# Patient Record
Sex: MALE | Race: AMERICAN INDIAN OR ALASKA NATIVE
[De-identification: names, ages, dates, MRNs, and addresses within clinical notes are randomized per-mention and may not be internally consistent; named-entity substitution may affect disease eponyms.]

---

## 2017-02-12 ENCOUNTER — HOSPITAL ENCOUNTER (EMERGENCY)
Dept: HOSPITAL 5 - ED | Age: 32
Discharge: HOME | End: 2017-02-12
Payer: SELF-PAY

## 2017-02-12 VITALS — SYSTOLIC BLOOD PRESSURE: 110 MMHG | DIASTOLIC BLOOD PRESSURE: 69 MMHG

## 2017-02-12 DIAGNOSIS — R19.7: Primary | ICD-10-CM

## 2017-02-12 LAB
ALBUMIN SERPL-MCNC: 3.9 G/DL (ref 3.9–5)
ALBUMIN/GLOB SERPL: 1.3 %
ALP SERPL-CCNC: 57 UNITS/L (ref 35–129)
ALT SERPL-CCNC: 18 UNITS/L (ref 7–56)
ANION GAP SERPL CALC-SCNC: 19 MMOL/L
BASOPHILS NFR BLD AUTO: 0.2 % (ref 0–1.8)
BILIRUB DIRECT SERPL-MCNC: < 0.2 MG/DL (ref 0–0.2)
BILIRUB SERPL-MCNC: 0.3 MG/DL (ref 0.1–1.2)
BUN SERPL-MCNC: 11 MG/DL (ref 9–20)
BUN/CREAT SERPL: 10 %
CALCIUM SERPL-MCNC: 8.1 MG/DL (ref 8.4–10.2)
CHLORIDE SERPL-SCNC: 102.7 MMOL/L (ref 98–107)
CO2 SERPL-SCNC: 22 MMOL/L (ref 22–30)
EOSINOPHIL NFR BLD AUTO: 0.3 % (ref 0–4.3)
GLUCOSE SERPL-MCNC: 106 MG/DL (ref 75–100)
HCT VFR BLD CALC: 44.4 % (ref 35.5–45.6)
HGB BLD-MCNC: 14.4 GM/DL (ref 11.8–15.2)
LIPASE SERPL-CCNC: 38 UNITS/L (ref 13–60)
MCH RBC QN AUTO: 30 PG (ref 28–32)
MCHC RBC AUTO-ENTMCNC: 33 % (ref 32–34)
MCV RBC AUTO: 93 FL (ref 84–94)
PLATELET # BLD: 215 K/MM3 (ref 140–440)
POTASSIUM SERPL-SCNC: 4.8 MMOL/L (ref 3.6–5)
PROT SERPL-MCNC: 6.9 G/DL (ref 6.3–8.2)
RBC # BLD AUTO: 4.78 M/MM3 (ref 3.65–5.03)
SODIUM SERPL-SCNC: 139 MMOL/L (ref 137–145)
WBC # BLD AUTO: 8.2 K/MM3 (ref 4.5–11)

## 2017-02-12 PROCEDURE — 36415 COLL VENOUS BLD VENIPUNCTURE: CPT

## 2017-02-12 PROCEDURE — 96360 HYDRATION IV INFUSION INIT: CPT

## 2017-02-12 PROCEDURE — 85025 COMPLETE CBC W/AUTO DIFF WBC: CPT

## 2017-02-12 PROCEDURE — 80048 BASIC METABOLIC PNL TOTAL CA: CPT

## 2017-02-12 PROCEDURE — 96361 HYDRATE IV INFUSION ADD-ON: CPT

## 2017-02-12 PROCEDURE — 80074 ACUTE HEPATITIS PANEL: CPT

## 2017-02-12 PROCEDURE — 83690 ASSAY OF LIPASE: CPT

## 2017-02-12 PROCEDURE — 99284 EMERGENCY DEPT VISIT MOD MDM: CPT

## 2017-02-12 NOTE — EMERGENCY DEPARTMENT REPORT
Blank Doc





- Documentation


Documentation: 





Due to [employer] issue I am unable to update the original chart therefore a 

separate note created.  Patient received 2 L of normal saline total.  He 

reports feeling better.  Patient did have significant diarrhea after initial 

liter but diarrhea stopped after receiving Imodium.  Patient continues to deny 

pain, fever, hematochezia, or melena.  He'll be discharged home on Imodium when 

necessary with a diagnosis of acute diarrhea.  Patient stable for disposition 

and follow-up provided.


 Lab Results











  02/12/17 02/12/17 02/12/17 Range/Units





  06:56 06:56 06:56 


 


WBC  8.2    (4.5-11.0)  K/mm3


 


RBC  4.78    (3.65-5.03)  M/mm3


 


Hgb  14.4    (11.8-15.2)  gm/dl


 


Hct  44.4    (35.5-45.6)  %


 


MCV  93    (84-94)  fl


 


MCH  30    (28-32)  pg


 


MCHC  33    (32-34)  %


 


RDW  14.0    (13.2-15.2)  %


 


Plt Count  215    (140-440)  K/mm3


 


Lymph % (Auto)  14.6    (13.4-35.0)  %


 


Mono % (Auto)  8.9 H    (0.0-7.3)  %


 


Eos % (Auto)  0.3    (0.0-4.3)  %


 


Baso % (Auto)  0.2    (0.0-1.8)  %


 


Lymph #  1.2    (1.2-5.4)  K/mm3


 


Mono #  0.7    (0.0-0.8)  K/mm3


 


Eos #  0.0    (0.0-0.4)  K/mm3


 


Baso #  0.0    (0.0-0.1)  K/mm3


 


Seg Neutrophils %  76.0 H    (40.0-70.0)  %


 


Seg Neutrophils #  6.3    (1.8-7.7)  K/mm3


 


Sodium   139   (137-145)  mmol/L


 


Potassium   4.8   (3.6-5.0)  mmol/L


 


Chloride   102.7   ()  mmol/L


 


Carbon Dioxide   22   (22-30)  mmol/L


 


Anion Gap   19   mmol/L


 


BUN   11   (9-20)  mg/dL


 


Creatinine   1.1   (0.8-1.5)  mg/dL


 


Estimated GFR   > 60   ml/min


 


BUN/Creatinine Ratio   10.00   %


 


Glucose   106 H   ()  mg/dL


 


Calcium   8.1 L   (8.4-10.2)  mg/dL


 


Total Bilirubin    0.3  (0.1-1.2)  mg/dL


 


Direct Bilirubin    < 0.2  (0-0.2)  mg/dL


 


AST    17  (5-40)  units/L


 


ALT    18  (7-56)  units/L


 


Alkaline Phosphatase    57  ()  units/L


 


Total Protein    6.9  (6.3-8.2)  g/dL


 


Albumin    3.9  (3.9-5)  g/dL


 


Albumin/Globulin Ratio    1.3  %


 


Lipase    38  (13-60)  units/L

## 2017-02-12 NOTE — EMERGENCY DEPARTMENT REPORT
ED N/V/D HPI





- General


Chief complaint: Nausea/Vomiting/Diarrhea


Stated complaint: DIARREAH/DIZZINESS


Time Seen by Provider: 02/12/17 12:15


Source: patient, EMS


Mode of arrival: Stretcher


Limitations: No Limitations





- History of Present Illness


Initial comments: 





31-year-old male with a past medical history epilepsy and bronchitis presents 

to the hospital complaining of explosive diarrhea 4 hours.  Patient states she'

s had about 4 episodes prior to arrival and initially complained of feeling 

weak upon triage.  Patient denies weakness while at rest at this time.  

Positive nausea reported without vomiting.  Patient denies fevers, sick contacts

, melena, hematochezia, recent travel, or recent antibiotics use.  No previous 

abdominal surgeries reported.  No pain reported





- Related Data


 Previous Rx's











 Medication  Instructions  Recorded  Last Taken  Type


 


Loperamide [Imodium] 2 mg PO Q2HR #20 capsule 02/12/17 Unknown Rx











 Allergies











Allergy/AdvReac Type Severity Reaction Status Date / Time


 


No Known Allergies Allergy   Unverified 02/12/17 06:33














ED Review of Systems


ROS: 


Stated complaint: DIARREAH/DIZZINESS


Other details as noted in HPI





Comment: All other systems reviewed and negative


Other: 





Constitutional: No fevers chills 


Eyes: No eye pain visual changes 


ENT: No ear pain or throat pain


Neck: Denies pain


Respiratory: Denies cough wheezing shortness of breath 


Cardiovascular: Denies chest pain, palpitations, syncope


GI: Denies abdominal pain


: Denies dysuria


Musculoskeletal: Denies back pain


Skin: Denies rash, lesions, erythema


Neurologic: Denies headache, numbness, weakness


Psychiatric: Denies suicidal ideation, hallucinations








ED Past Medical Hx





- Past Medical History


Additional medical history: BRONCHITIS, EPILEPSY





- Surgical History


Additional Surgical History: RIGHT INDEX FINGER SURGERY





- Social History


Smoking Status: Never Smoker


Substance Use Type: None





- Medications


Home Medications: 


 Home Medications











 Medication  Instructions  Recorded  Confirmed  Last Taken  Type


 


Loperamide [Imodium] 2 mg PO Q2HR #20 capsule 02/12/17  Unknown Rx














ED Physical Exam





- General


Limitations: No Limitations





- Other


Other exam information: 





General: No limitations, patient is alert in no acute distress


Head exam: Atraumatic, normocephalic


Eyes exam: Normal appearance, pupils equal reactive to light, extraocular 

movements intact


ENT: Moist mucous membrane, normal oropharynx


Neck exam: Normal inspection, full range of motion, no meningismus nontender


Respiratory exam: Clear to auscultation bilateral, no wheezes, rales, crackles


Cardiovascular: Normal rate and rhythm, normal heart sounds


Abdomen: Soft, nondistended, and  nontender, with normal bowel sounds, no 

rebound, or guarding


Extremity: Full range of motion normal inspection no deformity


Back: Normal Inspection, full range of motion, no tenderness


Neurologic: Alert, oriented x3, cranial nerves intact, no motor or sensory 

deficit


Psychiatric: normal affect, normal mood


Skin: Warm, dry, intact





ED Course


 Vital Signs











  02/12/17 02/12/17 02/12/17





  06:19 12:51 14:33


 


Temperature 97.6 F  


 


Pulse Rate 66  93 H


 


Pulse Rate [  77 





Lying]   


 


Pulse Rate [  87 





Sitting]   


 


Respiratory   18





Rate   


 


Blood Pressure 86/51  


 


Blood Pressure 86/51  110/69





[Left]   


 


Blood Pressure  94/57 





[Lying]   


 


Blood Pressure  93/65 





[Sitting]   


 


O2 Sat by Pulse 100  99





Oximetry   














- Reevaluation(s)


Reevaluation #1: 





02/12/17 12:40


Normal saline ordered





ED Medical Decision Making





- Lab Data


Result diagrams: 


 02/12/17 06:56





 02/12/17 06:56








 Lab Results











  02/12/17 02/12/17 02/12/17 Range/Units





  06:56 06:56 06:56 


 


WBC  8.2    (4.5-11.0)  K/mm3


 


RBC  4.78    (3.65-5.03)  M/mm3


 


Hgb  14.4    (11.8-15.2)  gm/dl


 


Hct  44.4    (35.5-45.6)  %


 


MCV  93    (84-94)  fl


 


MCH  30    (28-32)  pg


 


MCHC  33    (32-34)  %


 


RDW  14.0    (13.2-15.2)  %


 


Plt Count  215    (140-440)  K/mm3


 


Lymph % (Auto)  14.6    (13.4-35.0)  %


 


Mono % (Auto)  8.9 H    (0.0-7.3)  %


 


Eos % (Auto)  0.3    (0.0-4.3)  %


 


Baso % (Auto)  0.2    (0.0-1.8)  %


 


Lymph #  1.2    (1.2-5.4)  K/mm3


 


Mono #  0.7    (0.0-0.8)  K/mm3


 


Eos #  0.0    (0.0-0.4)  K/mm3


 


Baso #  0.0    (0.0-0.1)  K/mm3


 


Seg Neutrophils %  76.0 H    (40.0-70.0)  %


 


Seg Neutrophils #  6.3    (1.8-7.7)  K/mm3


 


Sodium   139   (137-145)  mmol/L


 


Potassium   4.8   (3.6-5.0)  mmol/L


 


Chloride   102.7   ()  mmol/L


 


Carbon Dioxide   22   (22-30)  mmol/L


 


Anion Gap   19   mmol/L


 


BUN   11   (9-20)  mg/dL


 


Creatinine   1.1   (0.8-1.5)  mg/dL


 


Estimated GFR   > 60   ml/min


 


BUN/Creatinine Ratio   10.00   %


 


Glucose   106 H   ()  mg/dL


 


Calcium   8.1 L   (8.4-10.2)  mg/dL


 


Total Bilirubin    0.3  (0.1-1.2)  mg/dL


 


Direct Bilirubin    < 0.2  (0-0.2)  mg/dL


 


AST    17  (5-40)  units/L


 


ALT    18  (7-56)  units/L


 


Alkaline Phosphatase    57  ()  units/L


 


Total Protein    6.9  (6.3-8.2)  g/dL


 


Albumin    3.9  (3.9-5)  g/dL


 


Albumin/Globulin Ratio    1.3  %


 


Lipase    38  (13-60)  units/L














- Medical Decision Making





Sx improved prior to d/c. See blank addendum





- Differential Diagnosis


enteritis, gastroenteritis, colitis, diverticulitis, appendicitis


Critical Care Time: No


Critical care attestation.: 


If time is entered above; I have spent that time in minutes in the direct care 

of this critically ill patient, excluding procedure time.








ED Disposition


Clinical Impression: 


Diarrhea


Qualifiers:


 Diarrhea type: unspecified type Qualified Code(s): R19.7 - Diarrhea, 

unspecified





Disposition: DISCHARGED TO HOME OR SELFCARE


Is pt being admited?: No


Condition: Stable


Instructions:  Acute Diarrhea (ED)


Additional Instructions: 


Take the medication as prescribed as needed for diarrhea.  Return if symptoms 

worsen as indicated by discharge instructions to follow-up with the doctor or 

clinic provided


Prescriptions: 


Loperamide [Imodium] 2 mg PO Q2HR #20 capsule


Referrals: 


[facility] [Provider Group] - 3-5 Days


LIBBY DORANTES JR, MD [Staff Physician] - 3-5 Days


PRIMARY CARE,MD [Primary Care Provider] - 3-5 Days


Time of Disposition: 17:15

## 2019-02-01 ENCOUNTER — HOSPITAL ENCOUNTER (EMERGENCY)
Dept: HOSPITAL 5 - ED | Age: 34
Discharge: HOME | End: 2019-02-01
Payer: SELF-PAY

## 2019-02-01 VITALS — SYSTOLIC BLOOD PRESSURE: 105 MMHG | DIASTOLIC BLOOD PRESSURE: 67 MMHG

## 2019-02-01 DIAGNOSIS — R20.0: Primary | ICD-10-CM

## 2019-02-01 LAB
ALBUMIN SERPL-MCNC: 4.5 G/DL (ref 3.9–5)
ALT SERPL-CCNC: 17 UNITS/L (ref 7–56)
BASOPHILS # (AUTO): 0 K/MM3 (ref 0–0.1)
BASOPHILS NFR BLD AUTO: 0.3 % (ref 0–1.8)
BUN SERPL-MCNC: 12 MG/DL (ref 9–20)
BUN/CREAT SERPL: 10 %
CALCIUM SERPL-MCNC: 9.4 MG/DL (ref 8.4–10.2)
EOSINOPHIL # BLD AUTO: 0 K/MM3 (ref 0–0.4)
EOSINOPHIL NFR BLD AUTO: 0.4 % (ref 0–4.3)
HCT VFR BLD CALC: 46.9 % (ref 35.5–45.6)
HEMOLYSIS INDEX: 14
HGB BLD-MCNC: 15.8 GM/DL (ref 11.8–15.2)
LYMPHOCYTES # BLD AUTO: 1.8 K/MM3 (ref 1.2–5.4)
LYMPHOCYTES NFR BLD AUTO: 40.4 % (ref 13.4–35)
MCHC RBC AUTO-ENTMCNC: 34 % (ref 32–34)
MCV RBC AUTO: 92 FL (ref 84–94)
MONOCYTES # (AUTO): 0.5 K/MM3 (ref 0–0.8)
MONOCYTES % (AUTO): 11 % (ref 0–7.3)
PLATELET # BLD: 266 K/MM3 (ref 140–440)
RBC # BLD AUTO: 5.07 M/MM3 (ref 3.65–5.03)

## 2019-02-01 PROCEDURE — 84484 ASSAY OF TROPONIN QUANT: CPT

## 2019-02-01 PROCEDURE — 85025 COMPLETE CBC W/AUTO DIFF WBC: CPT

## 2019-02-01 PROCEDURE — 80053 COMPREHEN METABOLIC PANEL: CPT

## 2019-02-01 PROCEDURE — 82553 CREATINE MB FRACTION: CPT

## 2019-02-01 PROCEDURE — 36415 COLL VENOUS BLD VENIPUNCTURE: CPT

## 2019-02-01 PROCEDURE — 82550 ASSAY OF CK (CPK): CPT

## 2019-02-01 PROCEDURE — 93010 ELECTROCARDIOGRAM REPORT: CPT

## 2019-02-01 PROCEDURE — 93005 ELECTROCARDIOGRAM TRACING: CPT

## 2019-02-01 PROCEDURE — 83735 ASSAY OF MAGNESIUM: CPT

## 2019-02-01 NOTE — EMERGENCY DEPARTMENT REPORT
HPI





- General


Chief Complaint: Pain General


Time Seen by Provider: 02/01/19 02:10





- HPI


HPI: 





Room 4





The patient is a 33-year-old male presenting with chief complaint of generalized

pain and paresthesia.  The patient states approximately 6 days ago he began 

having "flu symptoms" which include nausea and feeling as though he was going to

pass out.  The patient states he decided to make colloidal silver to see if it 

would help her symptoms as he has made it in the past.  The patient states this 

time considered using distilled water he used tap water.  The patient states he 

use tap water in a jar placed to [place] intubated and connected 1 

[place] to the negative terminal 1 [place] to the positive terminal of a 12 "better.  

Patient states he waited one hour and then drank the solution.  The patient 

states following morning began noticing intermittent tingling and burning in 

different places of his body.  The patient states the symptoms with migraine 2 

different locations.  Yesterday the patient states he had pain in both his feet 

while walking and pain in the left knee.  The patient states once cold his skin 

is sensitive to touch.  Patient states he is currently asymptomatic while in the

emergency department





Location: [See above]


Duration: [See above]


Quality:  [See above]


Severity:  [See above]


Modifying factors: [see above]


Context: [see above]


Mode of transportation: [not driving]





ED Past Medical Hx





- Past Medical History


Previous Medical History?: Yes


Additional medical history: BRONCHITIS, EPILEPSY, SLEEP APNEA





- Surgical History


Past Surgical History?: Yes


Additional Surgical History: RIGHT INDEX FINGER SURGERY





- Family History


Family history: no significant





- Social History


Smoking Status: Never Smoker


Substance Use Type: None (denies illicit drug use)





- Medications


Home Medications: 


                                Home Medications











 Medication  Instructions  Recorded  Confirmed  Last Taken  Type


 


Loperamide [Imodium] 2 mg PO Q2HR #20 capsule 02/12/17  Unknown Rx


 


traMADol [Ultram] 50 mg PO Q6HR PRN #14 tablet 02/01/19  Unknown Rx














ED Review of Systems


ROS: 


Stated complaint: BACK PAIN


Other details as noted in HPI





Constitutional: no symptoms reported


Eyes: denies: eye pain


ENT: denies: throat pain


Respiratory: no symptoms reported


Cardiovascular: denies: chest pain


Endocrine: no symptoms reported


Gastrointestinal: denies: abdominal pain


Genitourinary: denies: dysuria


Musculoskeletal: arthralgia, myalgia


Neurological: denies: headache





Physical Exam





- Physical Exam


Vital Signs: 


                                   Vital Signs











  02/01/19 02/01/19





  01:23 02:24


 


Temperature 98.6 F 97.9 F


 


Pulse Rate 82 74


 


Respiratory 18 17





Rate  


 


Blood Pressure 111/70 


 


Blood Pressure  111/75





[Left]  


 


O2 Sat by Pulse 98 97





Oximetry  











Physical Exam: 





GENERAL: The patient is well-developed well-nourished male lying on stretcher 

not appear to be in acute distress. []


HEENT: Normocephalic.  Atraumatic.  Extraocular motions are intact.  Patient has

 moist mucous membranes.


NECK: Supple.  Trachea midline


CHEST/LUNGS: Clear to auscultation.  There is no respiratory distress noted.


HEART/CARDIOVASCULAR: Regular.  There is no tachycardia.  There is no gallop rub

 or murmur.


ABDOMEN: Abdomen is soft, nontender.  Patient has normal bowel sounds.  There is

 no abdominal distention.


SKIN: There is no rash.  There is no edema.  There is no diaphoresis.


NEURO: The patient is awake, alert, and oriented.  The patient is cooperative.  

The patient has no focal neurologic deficits.  The patient has normal speech.  

Cranial nerves II through XII grossly intact, no drift


MUSCULOSKELETAL:  There is no evidence of acute injury.





ED Course


                                   Vital Signs











  02/01/19 02/01/19





  01:23 02:24


 


Temperature 98.6 F 97.9 F


 


Pulse Rate 82 74


 


Respiratory 18 17





Rate  


 


Blood Pressure 111/70 


 


Blood Pressure  111/75





[Left]  


 


O2 Sat by Pulse 98 97





Oximetry  














- Consultations


Consultation #1: 





02/01/19 02:49


Poison control called


02/01/19 02:53


Case discussed with poison control-symptoms aren't consistent with silver 

toxicity.  Recommend considering other medical causes for symptoms.  Check LFTs,

 white count and H&H if abnormal call back otherwise do not expect any adverse 

effects from patient's ingestion.  No other specific tests need to be ordered





ED Medical Decision Making





- Lab Data


Result diagrams: 


                                 02/01/19 02:04





                                 02/01/19 02:04





                                Laboratory Tests











  02/01/19 02/01/19 02/01/19





  02:04 02:04 02:04


 


WBC  4.5  


 


RBC  5.07 H  


 


Hgb  15.8 H  


 


Hct  46.9 H  


 


MCV  92  


 


MCH  31  


 


MCHC  34  


 


RDW  13.2  


 


Plt Count  266  


 


Lymph % (Auto)  40.4 H  


 


Mono % (Auto)  11.0 H  


 


Eos % (Auto)  0.4  


 


Baso % (Auto)  0.3  


 


Lymph #  1.8  


 


Mono #  0.5  


 


Eos #  0.0  


 


Baso #  0.0  


 


Seg Neutrophils %  47.9  


 


Seg Neutrophils #  2.1  


 


Sodium   142 


 


Potassium   4.2 


 


Chloride   102.6 


 


Carbon Dioxide   27 


 


Anion Gap   17 


 


BUN   12 


 


Creatinine   1.2 


 


Estimated GFR   > 60 


 


BUN/Creatinine Ratio   10 


 


Glucose   91 


 


Calcium   9.4 


 


Magnesium    2.10


 


Total Bilirubin   0.20 


 


AST   17 


 


ALT   17 


 


Alkaline Phosphatase   57 


 


Total Creatine Kinase    204 H


 


CK-MB (CK-2)    1.8


 


CK-MB (CK-2) Rel Index    0.8


 


Troponin T    < 0.010


 


Total Protein   7.8 


 


Albumin   4.5 


 


Albumin/Globulin Ratio   1.4 














- EKG Data


-: EKG Interpreted by Me


EKG shows normal: sinus rhythm


Rate: normal





- EKG Data


When compared to previous EKG there are: previous EKG unavailable


Interpretation: other (no ischemic changes seen)





- Medical Decision Making





Patient counseled on discontinuing making and consuming colloidal silver.  

Potential irreversible sequelae of silver toxicity discussed.  Patient 

verbalized understanding and states he will no longer consume colloidal silver





- Differential Diagnosis


electrolyte imbalance, anxiety


Critical care attestation.: 


If time is entered above; I have spent that time in minutes in the direct care 

of this critically ill patient, excluding procedure time.








ED Disposition


Clinical Impression: 


 Paresthesia





Disposition: DC-01 TO HOME OR SELFCARE


Is pt being admited?: No


Does the pt Need Aspirin: No


Condition: Stable


Instructions:  Paresthesia (ED)


Additional Instructions: 


Return to the emergency department immediately should you develop worsening 

symptoms, fever, inability to tolerate food or liquid or any other concerns.


Prescriptions: 


traMADol [Ultram] 50 mg PO Q6HR PRN #14 tablet


 PRN Reason: Pain


Referrals: 


MARTHA VARGAS DO [Staff Physician] - 3-5 Days


BISMARK DAVIDSON MD [Staff Physician] - 3-5 Days (Dr Davidson is a neurologist.  

Please follow up with him for further evaluation)


Time of Disposition: 03:46